# Patient Record
Sex: FEMALE | URBAN - METROPOLITAN AREA
[De-identification: names, ages, dates, MRNs, and addresses within clinical notes are randomized per-mention and may not be internally consistent; named-entity substitution may affect disease eponyms.]

---

## 2018-09-12 ENCOUNTER — HISTORICAL (OUTPATIENT)
Dept: LAB | Facility: HOSPITAL | Age: 33
End: 2018-09-12

## 2018-09-12 LAB
ABS NEUT (OLG): 6.27 X10(3)/MCL (ref 2.1–9.2)
AMPHET UR QL SCN: NORMAL
APPEARANCE, UA: CLEAR
BACTERIA SPEC CULT: ABNORMAL /HPF
BARBITURATE SCN PRESENT UR: NORMAL
BASOPHILS # BLD AUTO: 0 X10(3)/MCL (ref 0–0.2)
BASOPHILS NFR BLD AUTO: 0 %
BENZODIAZ UR QL SCN: NORMAL
BILIRUB UR QL STRIP: NEGATIVE
CANNABINOIDS UR QL SCN: NORMAL
COCAINE UR QL SCN: NORMAL
COLOR UR: YELLOW
EOSINOPHIL # BLD AUTO: 0.1 X10(3)/MCL (ref 0–0.9)
EOSINOPHIL NFR BLD AUTO: 1 %
ERYTHROCYTE [DISTWIDTH] IN BLOOD BY AUTOMATED COUNT: 12.3 % (ref 11.5–17)
GLUCOSE (UA): NEGATIVE
GROUP & RH: NORMAL
HBV SURFACE AG SERPL QL IA: NEGATIVE
HCT VFR BLD AUTO: 37.7 % (ref 37–47)
HGB BLD-MCNC: 12.7 GM/DL (ref 12–16)
HGB UR QL STRIP: NEGATIVE
HIV 1+2 AB+HIV1 P24 AG SERPL QL IA: NEGATIVE
KETONES UR QL STRIP: NEGATIVE
LEUKOCYTE ESTERASE UR QL STRIP: NEGATIVE
LYMPHOCYTES # BLD AUTO: 2.5 X10(3)/MCL (ref 0.6–4.6)
LYMPHOCYTES NFR BLD AUTO: 26 %
MCH RBC QN AUTO: 31.8 PG (ref 27–31)
MCHC RBC AUTO-ENTMCNC: 33.7 GM/DL (ref 33–36)
MCV RBC AUTO: 94.3 FL (ref 80–94)
MONOCYTES # BLD AUTO: 0.6 X10(3)/MCL (ref 0.1–1.3)
MONOCYTES NFR BLD AUTO: 6 %
NEUTROPHILS # BLD AUTO: 6.27 X10(3)/MCL (ref 1.4–7.9)
NEUTROPHILS NFR BLD AUTO: 66 %
NITRITE UR QL STRIP: NEGATIVE
OPIATES UR QL SCN: NORMAL
PCP UR QL: NORMAL
PH UR STRIP.AUTO: 6.5 [PH] (ref 5–7.5)
PH UR STRIP: 6.5 [PH] (ref 5–9)
PLATELET # BLD AUTO: 246 X10(3)/MCL (ref 130–400)
PMV BLD AUTO: 10.4 FL (ref 9.4–12.4)
PROT UR QL STRIP: NEGATIVE
RBC # BLD AUTO: 4 X10(6)/MCL (ref 4.2–5.4)
RBC #/AREA URNS HPF: ABNORMAL /[HPF]
RPR SER QL: NORMAL
SP GR FLD REFRACTOMETRY: 1.01 (ref 1–1.03)
SP GR UR STRIP: 1.01 (ref 1–1.03)
SQUAMOUS EPITHELIAL, UA: ABNORMAL
UROBILINOGEN UR STRIP-ACNC: 0.2
WBC # SPEC AUTO: 9.5 X10(3)/MCL (ref 4.5–11.5)
WBC #/AREA URNS HPF: ABNORMAL /[HPF]

## 2018-09-15 LAB — FINAL CULTURE: NORMAL

## 2018-10-03 LAB
ABS NEUT (OLG): 7.48 X10(3)/MCL (ref 2.1–9.2)
APPEARANCE, UA: ABNORMAL
BACTERIA SPEC CULT: ABNORMAL /HPF
BASOPHILS # BLD AUTO: 0 X10(3)/MCL (ref 0–0.2)
BASOPHILS NFR BLD AUTO: 0 %
BILIRUB UR QL STRIP: NEGATIVE
COLOR UR: ABNORMAL
EOSINOPHIL # BLD AUTO: 0.1 X10(3)/MCL (ref 0–0.9)
EOSINOPHIL NFR BLD AUTO: 1 %
ERYTHROCYTE [DISTWIDTH] IN BLOOD BY AUTOMATED COUNT: 12.4 % (ref 11.5–17)
GLUCOSE (UA): NEGATIVE
GROUP & RH: NORMAL
HCT VFR BLD AUTO: 39.2 % (ref 37–47)
HGB BLD-MCNC: 13.1 GM/DL (ref 12–16)
HGB UR QL STRIP: ABNORMAL
KETONES UR QL STRIP: ABNORMAL
LEUKOCYTE ESTERASE UR QL STRIP: ABNORMAL
LYMPHOCYTES # BLD AUTO: 2.2 X10(3)/MCL (ref 0.6–4.6)
LYMPHOCYTES NFR BLD AUTO: 22 %
MCH RBC QN AUTO: 31.7 PG (ref 27–31)
MCHC RBC AUTO-ENTMCNC: 33.4 GM/DL (ref 33–36)
MCV RBC AUTO: 94.9 FL (ref 80–94)
MONOCYTES # BLD AUTO: 0.5 X10(3)/MCL (ref 0.1–1.3)
MONOCYTES NFR BLD AUTO: 5 %
NEUTROPHILS # BLD AUTO: 7.48 X10(3)/MCL (ref 2.1–9.2)
NEUTROPHILS NFR BLD AUTO: 72 %
NITRITE UR QL STRIP: NEGATIVE
PH UR STRIP: 5.5 [PH] (ref 5–9)
PLATELET # BLD AUTO: 234 X10(3)/MCL (ref 130–400)
PMV BLD AUTO: 10 FL (ref 9.4–12.4)
PROT UR QL STRIP: ABNORMAL
RBC # BLD AUTO: 4.13 X10(6)/MCL (ref 4.2–5.4)
RBC #/AREA URNS HPF: 693 /HPF (ref 0–2)
SP GR UR STRIP: 1.02 (ref 1–1.03)
SQUAMOUS EPITHELIAL, UA: 6 /HPF (ref 0–4)
UROBILINOGEN UR STRIP-ACNC: 0.2
WBC # SPEC AUTO: 10.3 X10(3)/MCL (ref 4.5–11.5)
WBC #/AREA URNS HPF: 9 /HPF (ref 0–3)

## 2018-10-04 ENCOUNTER — HISTORICAL (OUTPATIENT)
Dept: ADMINISTRATIVE | Facility: HOSPITAL | Age: 33
End: 2018-10-04

## 2020-07-21 ENCOUNTER — HISTORICAL (OUTPATIENT)
Dept: ADMINISTRATIVE | Facility: HOSPITAL | Age: 35
End: 2020-07-21

## 2020-07-21 LAB
ABS NEUT (OLG): 5.68 X10(3)/MCL (ref 2.1–9.2)
BASOPHILS # BLD AUTO: 0 X10(3)/MCL (ref 0–0.2)
BASOPHILS NFR BLD AUTO: 0 %
EOSINOPHIL # BLD AUTO: 0.1 X10(3)/MCL (ref 0–0.9)
EOSINOPHIL NFR BLD AUTO: 1 %
ERYTHROCYTE [DISTWIDTH] IN BLOOD BY AUTOMATED COUNT: 13.8 % (ref 11.5–17)
GROUP & RH: NORMAL
HBV SURFACE AG SERPL QL IA: NONREACTIVE
HCT VFR BLD AUTO: 38.1 % (ref 37–47)
HCV AB SERPL QL IA: NONREACTIVE
HGB BLD-MCNC: 12.7 GM/DL (ref 12–16)
HIV 1+2 AB+HIV1 P24 AG SERPL QL IA: NONREACTIVE
LYMPHOCYTES # BLD AUTO: 1.8 X10(3)/MCL (ref 0.6–4.6)
LYMPHOCYTES NFR BLD AUTO: 22 %
MCH RBC QN AUTO: 31.5 PG (ref 27–31)
MCHC RBC AUTO-ENTMCNC: 33.3 GM/DL (ref 33–36)
MCV RBC AUTO: 94.5 FL (ref 80–94)
MONOCYTES # BLD AUTO: 0.4 X10(3)/MCL (ref 0.1–1.3)
MONOCYTES NFR BLD AUTO: 5 %
NEUTROPHILS # BLD AUTO: 5.68 X10(3)/MCL (ref 2.1–9.2)
NEUTROPHILS NFR BLD AUTO: 71 %
PLATELET # BLD AUTO: 244 X10(3)/MCL (ref 130–400)
PMV BLD AUTO: 10.3 FL (ref 9.4–12.4)
RBC # BLD AUTO: 4.03 X10(6)/MCL (ref 4.2–5.4)
T PALLIDUM AB SER QL: NONREACTIVE
TSH SERPL-ACNC: 1.78 UIU/ML (ref 0.35–4.94)
WBC # SPEC AUTO: 8 X10(3)/MCL (ref 4.5–11.5)

## 2020-07-23 LAB — FINAL CULTURE: NORMAL

## 2020-10-30 ENCOUNTER — HISTORICAL (OUTPATIENT)
Dept: ADMINISTRATIVE | Facility: HOSPITAL | Age: 35
End: 2020-10-30

## 2020-10-30 LAB
GLUCOSE 1H P 100 G GLC PO SERPL-MCNC: 114 MG/DL (ref 100–180)
HCT VFR BLD AUTO: 38.8 % (ref 37–47)
HGB BLD-MCNC: 12.8 GM/DL (ref 12–16)

## 2020-12-07 ENCOUNTER — HISTORICAL (OUTPATIENT)
Dept: ADMINISTRATIVE | Facility: HOSPITAL | Age: 35
End: 2020-12-07

## 2020-12-09 LAB — FINAL CULTURE: NORMAL

## 2022-04-30 NOTE — H&P
Patient:   Quiana Walker             MRN: [id number]            FIN: [id number]               Age:   32 years     Sex:  Female     :  1985   Associated Diagnoses:   None   Author:   Gen Carranza MD      Health Status   The H&P was reviewed, the patient was examined, and there are no changes to the patient's condition..

## 2022-04-30 NOTE — H&P
Patient:   Quiana Walker             MRN: [id number]            FIN: [id number]               Age:   32 years     Sex:  Female     :  1985   Associated Diagnoses:   Pregnant state, incidental   Author:   Gen Carranza MD      Basic Information   Source of history   Present at bedside   Referral source      Chief Complaint   10/2/2018 0:51 CDT       states went to restroom [facility] and saw clots and having vaginal bleeding/ states [facility] was / pt is 11 weeks preg/ fourth time pregnant, three living kids and states this has never happened before        History of Present Illness        The patient presents with 32-year-old female approximately 8-1/2 weeks estimated gestational age by last menstrual period and ultrasound presents to the ER for vaginal bleeding.  Ultrasound there confirms fetal demise and a missed .  Alternatives risks and complications have been discussed the patient.  She has elected to proceed with dilatation and curettage.  Questions were answered to her satisfaction.  Operative consents have been signed..     Review of Systems   Obstetric   Gynecologic   Neurologic   ROS reviewed as documented in chart      Health Status   Allergies:    Allergic Reactions (All)  No Known Medication Allergies,    Allergies (1) Active Reaction  No Known Medication Allergies None Documented     Current medications:  (Selected)   Prescriptions  Prescribed  Methergine 0.2 mg oral tablet: 0.2 mg = 1 tab(s), Oral, TID, X 3 day(s), # 9 tab(s), 0 Refill(s), Pharmacy: [facility] [id number]  [place] 7.5 mg-325 mg oral tablet: 1 tab(s), Oral, q4hr, PRN PRN for pain, X 5 day(s), # 24 tab(s), 0 Refill(s), Pharmacy: [facility] [id number]   Problem list:    No problem items selected or recorded.      Histories   Past Medical History:    No active or resolved past medical history items have been selected or recorded.   Family History:    No family history items have been selected or recorded.    Procedure history:    No active procedure history items have been selected or recorded.   Social History        Social & Psychosocial Habits    Tobacco  10/02/2018  Use: Never smoker    Smoking Cessation Counseling N/A    Comment: [email or web address] - 10/02/2018 00:54 - Abiel EARLY, Michael GRAYSON  .        Physical Examination   Vital Signs   10/3/2018 11:14 CDT      Peripheral Pulse Rate     87 bpm                             Respiratory Rate          16 br/min                             SpO2                      99 %                             Oxygen Therapy            Room air                             Systolic Blood Pressure   113 mmHg                             Diastolic Blood Pressure  79 mmHg                             Mean Arterial Pressure, Cuff              90 mmHg    10/2/2018 2:15 CDT       Peripheral Pulse Rate     65 bpm                             Respiratory Rate          20 br/min                             SpO2                      100 %                             Oxygen Therapy            Room air                             Systolic Blood Pressure   112 mmHg                             Diastolic Blood Pressure  69 mmHg                             Mean Arterial Pressure, Cuff              83 mmHg    10/2/2018 0:51 CDT       Temperature Oral          37.0 DegC                             Temperature Oral (calculated)             98.60 DegF                             Peripheral Pulse Rate     79 bpm                             Respiratory Rate          22 br/min                             SpO2                      99 %                             Systolic Blood Pressure   148 mmHg  HI                             Diastolic Blood Pressure  86 mmHg        Vital Signs (last 24 hrs)_____  Last Charted___________  Heart Rate Peripheral   87 bpm  (OCT 03 11:14)  Resp Rate         16 br/min  (OCT 03 11:14)  SBP      113 mmHg  (OCT 03 11:14)  DBP      79 mmHg  (OCT 03 11:14)  SpO2      99 %  (OCT 03 11:14)      General:  Alert and oriented, No acute distress.    HENT:  Normocephalic.    Respiratory:  Lungs are clear to auscultation, Respirations are non-labored.    Cardiovascular:  Normal rate, Regular rhythm.    Obstetric Exam     Uterus: consistent with  8  weeks gestation.     Perineum: intact.     Vagina: bleeding scant amount.     Cervix: dilated 0  cm, 0  % effaced.     Neurologic:  Alert, Oriented.       Impression and Plan   Diagnosis     Pregnant state, incidental ([id number] Z33.1).     missed ..     Course:  Progressing as expected, plan: Dilatation and curettage..

## 2022-04-30 NOTE — OP NOTE
Patient:   Quiana Walker             MRN: [id number]            FIN: [id number]               Age:   32 years     Sex:  Female     :  1985   Associated Diagnoses:   None   Author:   Gen Carranza MD      Basic Information   Admit information:  see history and physical.       OPERATIVE DICTATION NOTE: Dilatation and Curettage for Missed  1st Trimester    Date: 2018    Preoperative diagnosis: Missed     Postoperative diagnosis: Same.    Surgeon: Gen Carranza M.D.    Anesthesia: Gen.    Estimated blood loss: 50 mL    Specimen: Products of conception.    Indications: Patient is a 32-year-old female who had diagnosis of missed  on ultrasound during emergency room visit.  Alternatives, risks, and complications were discussed the patient.  She elected to proceed with dilatation and curettage for treatment.  Questions were answered to her satisfaction.  Operative consents were signed.    Procedure in detail: The patient went to the operating room and was placed supine upon the table.  After adequate level of general anesthesia was placed in dorsolithotomy position prepped and sterilely draped.  A speculum was placed the vagina the anterior lip the cervix was grasped a single-tooth tenaculum.  The uterine cervix was progressively dilated to accommodate a #12 Hegar dilator.  At this time sharp and suction curettage evacuated the uterine cavity to completion.  Adequate hemostasis was observed.  The patient went to recovery in stable condition.